# Patient Record
Sex: FEMALE | Race: WHITE | HISPANIC OR LATINO | URBAN - METROPOLITAN AREA
[De-identification: names, ages, dates, MRNs, and addresses within clinical notes are randomized per-mention and may not be internally consistent; named-entity substitution may affect disease eponyms.]

---

## 2022-12-14 ENCOUNTER — OFFICE VISIT (OUTPATIENT)
Dept: INTERNAL MEDICINE | Age: 22
End: 2022-12-14

## 2022-12-14 VITALS
SYSTOLIC BLOOD PRESSURE: 126 MMHG | OXYGEN SATURATION: 98 % | HEIGHT: 68 IN | WEIGHT: 290.6 LBS | HEART RATE: 88 BPM | BODY MASS INDEX: 44.04 KG/M2 | DIASTOLIC BLOOD PRESSURE: 82 MMHG | TEMPERATURE: 99.5 F

## 2022-12-14 DIAGNOSIS — R63.5 ABNORMAL WEIGHT GAIN: ICD-10-CM

## 2022-12-14 DIAGNOSIS — M17.12 PRIMARY OSTEOARTHRITIS OF LEFT KNEE: Primary | ICD-10-CM

## 2022-12-14 DIAGNOSIS — Z76.89 ESTABLISHING CARE WITH NEW DOCTOR, ENCOUNTER FOR: ICD-10-CM

## 2022-12-14 PROBLEM — E66.01 CLASS 3 SEVERE OBESITY DUE TO EXCESS CALORIES WITHOUT SERIOUS COMORBIDITY WITH BODY MASS INDEX (BMI) OF 40.0 TO 44.9 IN ADULT: Status: ACTIVE | Noted: 2022-12-14

## 2022-12-14 PROBLEM — J30.1 SEASONAL ALLERGIC RHINITIS DUE TO POLLEN: Status: ACTIVE | Noted: 2022-12-14

## 2022-12-14 PROCEDURE — 99204 OFFICE O/P NEW MOD 45 MIN: CPT | Performed by: NURSE PRACTITIONER

## 2022-12-14 RX ORDER — ACETAMINOPHEN AND CODEINE PHOSPHATE 120; 12 MG/5ML; MG/5ML
0.35 SOLUTION ORAL DAILY
COMMUNITY
Start: 2022-09-02

## 2022-12-14 RX ORDER — SPIRONOLACTONE 100 MG/1
100 TABLET, FILM COATED ORAL DAILY
COMMUNITY

## 2022-12-14 NOTE — PROGRESS NOTES
I N T E R N A L  M E D I C I N E  Aida Lin, APRN    ENCOUNTER DATE:  12/14/2022    Yara Carroll / 22 y.o. / female      CHIEF COMPLAINT / REASON FOR OFFICE VISIT     Establish Care (Just wants an overall checkup)      ASSESSMENT & PLAN     Diagnoses and all orders for this visit:    1. Primary osteoarthritis of left knee (Primary)  -     XR Knee 1 or 2 View Left; Future    2. Class 3 severe obesity due to excess calories without serious comorbidity with body mass index (BMI) of 40.0 to 44.9 in adult (HCC)  Patient's (Body mass index is 44.19 kg/m².) indicates that they are morbidly obese (BMI > 40 or > 35 with obesity - related health condition) with health conditions that include none . Weight is newly identified. BMI is is above average; BMI management plan is completed. We discussed low calorie, low carb based diet program, portion control, increasing exercise, joining a fitness center or start home based exercise program and Weight Watchers or other Commercial based weight reduction program.     3. Establishing care with new doctor, encounter for  -     CBC w AUTO Differential  -     Hemoglobin A1c  -     Comprehensive metabolic panel  -     TSH+Free T4  -     Lipid panel    SUMMARY/DISCUSSION  • Discussion of xray left knee and agreeable. Order for routine labs.  • Discussion of exercise, weight loss, and diet  • Followed by OB/GYN, Dr Katina Nguyen  • Followed by Dermatology for Acne  • I spent 50 min in direct care of this patient on this date of service. This time includes times spent by me in the following activities: Preparing for the visit, obtaining and/or reviewing a separately obtained history, performing a medically appropriate examination and/or evaluation, reviewing medical records, reviewing tests, ordering medications, tests, or procedures, counseling and educating the patient, documenting information in the medical record and reviewing office note/correspondence from other providers.  "    Return in about 6 months (around 6/14/2023) for Next scheduled follow up.      VITAL SIGNS     Visit Vitals  /82 (BP Location: Left arm, Patient Position: Sitting, Cuff Size: Large Adult)   Pulse 88   Temp 99.5 °F (37.5 °C) (Temporal)   Ht 172.7 cm (68\")   Wt 132 kg (290 lb 9.6 oz)   LMP 08/05/2022 (Approximate)   SpO2 98%   BMI 44.19 kg/m²           BP Readings from Last 3 Encounters:   12/14/22 126/82     Wt Readings from Last 3 Encounters:   12/14/22 132 kg (290 lb 9.6 oz)     Body mass index is 44.19 kg/m².    Blood pressure readings recorded on patient flowsheet:  No flowsheet data found.          MEDICATIONS AT THE TIME OF OFFICE VISIT     Current Outpatient Medications on File Prior to Visit   Medication Sig Dispense Refill   • norethindrone (MICRONOR) 0.35 MG tablet Take 0.35 mg by mouth Daily.     • spironolactone (ALDACTONE) 100 MG tablet Take 100 mg by mouth Daily.       No current facility-administered medications on file prior to visit.        HISTORY OF PRESENT ILLNESS     22 year old female being seen today to establish care with PCP.  States played volleyball all through high school and having left knee discomfort for past several months. States no known injury or trauma. States taking tylenol/ibuprofen with adequate relief.  States knows she needs to loose weight and exercise, monitor low fat/low cholesterol foods. Patient states family Hx of Thyroid disease and Diabetes but denies any symptoms.   Patient states chronic issue with acne and followed by Dermatology and currently on spironolactone.     Patient Care Team:  Aida Lin APRN as PCP - General (Family Medicine)    REVIEW OF SYSTEMS     Review of Systems   Constitutional: Positive for activity change.   HENT: Negative.    Respiratory: Negative.    Cardiovascular: Negative.    Gastrointestinal: Negative.    Genitourinary: Negative.    Musculoskeletal: Positive for arthralgias.   Neurological: Negative.  "   Psychiatric/Behavioral: Negative.           PHYSICAL EXAMINATION     Physical Exam  Constitutional:       Appearance: Normal appearance. She is obese.   Cardiovascular:      Rate and Rhythm: Normal rate and regular rhythm.      Pulses: Normal pulses.      Heart sounds: Normal heart sounds.   Pulmonary:      Effort: Pulmonary effort is normal.      Breath sounds: Normal breath sounds.   Abdominal:      General: Bowel sounds are normal.      Palpations: Abdomen is soft.   Musculoskeletal:         General: Tenderness present.   Skin:     General: Skin is warm and dry.   Neurological:      Mental Status: She is alert and oriented to person, place, and time.             REVIEWED DATA     Labs:     No results found for: NA, K, CALCIUM, AST, ALT, BUN, CREATININE, EGFRIFNONA, EGFRIFAFRI    No results found for: HGBA1C    Lab Results   Component Value Date    LDL 0 02/22/2021       Lab Results   Component Value Date    TSH 2.040 03/19/2021       No results found for: WBC, HGB, PLT    No results found for: MALBCRERATIO       Imaging:           Medical Tests:           Summary of old records / correspondence / consultant report:           Request outside records:           ANDREZ Poon

## 2022-12-14 NOTE — ASSESSMENT & PLAN NOTE
Patient's (Body mass index is 44.19 kg/m².) indicates that they are morbidly obese (BMI > 40 or > 35 with obesity - related health condition) with health conditions that include none . Weight is newly identified. BMI is is above average; BMI management plan is completed. We discussed low calorie, low carb based diet program, portion control, increasing exercise, joining a fitness center or start home based exercise program and Weight Watchers or other Commercial based weight reduction program.

## 2022-12-15 LAB
ALBUMIN SERPL-MCNC: 4.8 G/DL (ref 3.5–5.2)
ALBUMIN/GLOB SERPL: 2.2 G/DL
ALP SERPL-CCNC: 105 U/L (ref 39–117)
ALT SERPL-CCNC: 41 U/L (ref 1–33)
AST SERPL-CCNC: 27 U/L (ref 1–32)
BASOPHILS # BLD AUTO: 0.03 10*3/MM3 (ref 0–0.2)
BASOPHILS NFR BLD AUTO: 0.5 % (ref 0–1.5)
BILIRUB SERPL-MCNC: 0.6 MG/DL (ref 0–1.2)
BUN SERPL-MCNC: 10 MG/DL (ref 6–20)
BUN/CREAT SERPL: 18.9 (ref 7–25)
CALCIUM SERPL-MCNC: 10.2 MG/DL (ref 8.6–10.5)
CHLORIDE SERPL-SCNC: 104 MMOL/L (ref 98–107)
CHOLEST SERPL-MCNC: 169 MG/DL (ref 0–200)
CO2 SERPL-SCNC: 25.6 MMOL/L (ref 22–29)
CREAT SERPL-MCNC: 0.53 MG/DL (ref 0.57–1)
EGFRCR SERPLBLD CKD-EPI 2021: 136 ML/MIN/1.73
EOSINOPHIL # BLD AUTO: 0.08 10*3/MM3 (ref 0–0.4)
EOSINOPHIL NFR BLD AUTO: 1.4 % (ref 0.3–6.2)
ERYTHROCYTE [DISTWIDTH] IN BLOOD BY AUTOMATED COUNT: 12.2 % (ref 12.3–15.4)
GLOBULIN SER CALC-MCNC: 2.2 GM/DL
GLUCOSE SERPL-MCNC: 96 MG/DL (ref 65–99)
HBA1C MFR BLD: 5.2 % (ref 4.8–5.6)
HCT VFR BLD AUTO: 41.7 % (ref 34–46.6)
HDLC SERPL-MCNC: 47 MG/DL (ref 40–60)
HGB BLD-MCNC: 14.7 G/DL (ref 12–15.9)
IMM GRANULOCYTES # BLD AUTO: 0.02 10*3/MM3 (ref 0–0.05)
IMM GRANULOCYTES NFR BLD AUTO: 0.3 % (ref 0–0.5)
LDLC SERPL CALC-MCNC: 95 MG/DL (ref 0–100)
LYMPHOCYTES # BLD AUTO: 2.33 10*3/MM3 (ref 0.7–3.1)
LYMPHOCYTES NFR BLD AUTO: 39.5 % (ref 19.6–45.3)
MCH RBC QN AUTO: 31 PG (ref 26.6–33)
MCHC RBC AUTO-ENTMCNC: 35.3 G/DL (ref 31.5–35.7)
MCV RBC AUTO: 88 FL (ref 79–97)
MONOCYTES # BLD AUTO: 0.55 10*3/MM3 (ref 0.1–0.9)
MONOCYTES NFR BLD AUTO: 9.3 % (ref 5–12)
NEUTROPHILS # BLD AUTO: 2.89 10*3/MM3 (ref 1.7–7)
NEUTROPHILS NFR BLD AUTO: 49 % (ref 42.7–76)
NRBC BLD AUTO-RTO: 0 /100 WBC (ref 0–0.2)
PLATELET # BLD AUTO: 300 10*3/MM3 (ref 140–450)
POTASSIUM SERPL-SCNC: 4.3 MMOL/L (ref 3.5–5.2)
PROT SERPL-MCNC: 7 G/DL (ref 6–8.5)
RBC # BLD AUTO: 4.74 10*6/MM3 (ref 3.77–5.28)
SODIUM SERPL-SCNC: 141 MMOL/L (ref 136–145)
T4 FREE SERPL-MCNC: 1.61 NG/DL (ref 0.93–1.7)
TRIGL SERPL-MCNC: 153 MG/DL (ref 0–150)
TSH SERPL DL<=0.005 MIU/L-ACNC: 2.18 UIU/ML (ref 0.27–4.2)
VLDLC SERPL CALC-MCNC: 27 MG/DL (ref 5–40)
WBC # BLD AUTO: 5.9 10*3/MM3 (ref 3.4–10.8)

## 2022-12-29 ENCOUNTER — TELEPHONE (OUTPATIENT)
Dept: INTERNAL MEDICINE | Age: 22
End: 2022-12-29

## 2022-12-29 NOTE — TELEPHONE ENCOUNTER
Caller: AMOS CALLEJAS    Relationship: Mother    Best call back number: CALL PATIENT [phone]    What was the call regarding: PATIENT'S MOTHER STATED THAT PATIENT WAS REFERRED FOR AN XRAY AND DID NOT GO AND NOW THE REFERRAL HAS . PATIENT'S MOTHER IS ASKING THAT THE REFERRAL BE SENT IN AGAIN. PATIENT'S MOTHER STATED THAT PATIENT DID NOT KNOW WHERE SHE WAS SUPPOSED TO GO TO GET THE XRAY. PATIENT'S MOTHER IS ASKING THAT SOMEONE CALL PATIENT TO EXPLAIN THE PROCESS. PLEASE ADVISE.    Do you require a callback: YES

## 2022-12-30 NOTE — TELEPHONE ENCOUNTER
Called pt to discuss xray, no answer, vm was full so I could not leave one, will attempt a second call back.

## 2023-04-24 ENCOUNTER — HOSPITAL ENCOUNTER (OUTPATIENT)
Dept: GENERAL RADIOLOGY | Facility: HOSPITAL | Age: 23
Discharge: HOME | End: 2023-04-24
Admitting: NURSE PRACTITIONER
Payer: COMMERCIAL

## 2023-04-24 DIAGNOSIS — M17.12 PRIMARY OSTEOARTHRITIS OF LEFT KNEE: ICD-10-CM

## 2023-04-24 PROCEDURE — 73560 X-RAY EXAM OF KNEE 1 OR 2: CPT

## 2023-04-25 ENCOUNTER — TELEPHONE (OUTPATIENT)
Dept: INTERNAL MEDICINE | Age: 23
End: 2023-04-25

## 2023-04-25 DIAGNOSIS — E66.01 CLASS 3 SEVERE OBESITY DUE TO EXCESS CALORIES WITHOUT SERIOUS COMORBIDITY WITH BODY MASS INDEX (BMI) OF 40.0 TO 44.9 IN ADULT: Primary | ICD-10-CM

## 2023-04-25 DIAGNOSIS — M17.12 PRIMARY OSTEOARTHRITIS OF LEFT KNEE: ICD-10-CM

## 2023-04-25 NOTE — TELEPHONE ENCOUNTER
Caller: Yara Carroll    Relationship: Self    Best call back number: [phone]    What is the best time to reach you: ANY     Who are you requesting to speak with (clinical staff, provider,  specific staff member): CLINICAL STAFF     What was the call regarding: WOULD LIKE TO KNOW IF THERE IS A KNEE BRACE OR EXERCISES SHE CAN DO UNTIL HER PHYSICAL THERAPY APPOINTMENT IN 2 WEEKS FROM NOW.     Do you require a callback: YES

## 2023-05-15 ENCOUNTER — HOSPITAL ENCOUNTER (OUTPATIENT)
Dept: PHYSICAL THERAPY | Facility: HOSPITAL | Age: 23
Setting detail: THERAPIES SERIES
Discharge: HOME | End: 2023-05-15
Payer: COMMERCIAL

## 2023-05-15 DIAGNOSIS — M62.81 MUSCLE WEAKNESS OF LOWER EXTREMITY: ICD-10-CM

## 2023-05-15 DIAGNOSIS — M25.60 RANGE OF MOTION DEFICIT: ICD-10-CM

## 2023-05-15 DIAGNOSIS — M25.562 LEFT KNEE PAIN, UNSPECIFIED CHRONICITY: Primary | ICD-10-CM

## 2023-05-15 DIAGNOSIS — R26.9 GAIT DIFFICULTY: ICD-10-CM

## 2023-05-15 PROCEDURE — 97110 THERAPEUTIC EXERCISES: CPT

## 2023-05-15 PROCEDURE — 97161 PT EVAL LOW COMPLEX 20 MIN: CPT

## 2023-05-16 NOTE — THERAPY EVALUATION
Outpatient Physical Therapy Ortho Initial Evaluation  [facility]     Patient Name: Yara Carroll  : 2000  MRN: [id number]  Today's Date: 5/15/2023      Visit Date: 05/15/2023    Patient Active Problem List   Diagnosis   • Hirsutism   • Acne vulgaris   • Establishing care with new doctor, encounter for   • Primary osteoarthritis of left knee   • Seasonal allergic rhinitis due to pollen   • Class 3 severe obesity due to excess calories without serious comorbidity with body mass index (BMI) of 40.0 to 44.9 in adult        Past Medical History:   Diagnosis Date   • Allergic         Past Surgical History:   Procedure Laterality Date   • EAR TUBES Bilateral        Visit Dx:     ICD-10-CM ICD-9-CM   1. Left knee pain, unspecified chronicity  M25.562 719.46   2. Muscle weakness of lower extremity  M62.81 728.87   3. Range of motion deficit  M25.60 719.50   4. Gait difficulty  R26.9 781.2          Patient History     Row Name 05/15/23 0800             History    Chief Complaint Balance Problems  -JA      Brief Description of Current Complaint L knee pain began in HS playing volleyball, stopped and it stopped. 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 Noticed increasing pain over 6 months, took a trip and had  incresaed pain when she got back. Saw dcotor and got a knee brace that has helped the pain. Feels the brace prevents pain. On work days has pain after work, driving home is painful. A little in back of knee and inside, L knee, 1/10 today. Worst was 10/10, but now 6-7/10.  -JA      Patient/Caregiver Goals Relieve pain;Know what to do to help the symptoms  -JA      Occupation/sports/leisure activities works 45 hours a week, walks dog  -JA         Pain     Pain Location Knee  -JA      Pain at Present 1  -JA      Pain at Worst 6;7  -JA      Is your sleep disturbed? No  -JA         Fall Risk Assessment    Any falls in the past year: No  -JA         Daily Activities    Primary Language English  -JA      Are you  able to read Yes  -JA      Are you able to write Yes  -JA      How does patient learn best? Reading;Listening;Demonstration  -JA      Teaching needs identified Home Exercise Program;Management of Condition  -JA      Barriers to learning None  -JA      Recommended Referrals Physical Therapy  -JA      Pt Participated in POC and Goals Yes  -JA         Safety    Are you being hurt, hit, or frightened by anyone at home or in your life? Unspecified  -JA            User Key  (r) = Recorded By, (t) = Taken By, (c) = Cosigned By    Initials Name Provider Type    Maribel San, PT Physical Therapist                 PT Ortho     Row Name 05/15/23 0900       General ROM    RT Lower Ext Rt Knee Extension/Flexion  -JA    LT Lower Ext Lt Knee Extension/Flexion  -JA    GENERAL ROM COMMENTS L involved side  -JA       Right Lower Ext    Rt Knee Extension/Flexion AROM +10-0-135  -JA    RT Lower Extremity Comments uninvolved side  -JA       Left Lower Ext    Lt Knee Extension/Flexion AROM 0-0-125  -JA    LT Lower Extremity Comments involved knee  -JA       MMT (Manual Muscle Testing)    Rt Lower Ext Rt Hip Flexion;Rt Hip Extension;Rt Hip ABduction;Rt Hip Internal (Medial) Rotation;Rt Hip External (Lateral) Rotation;Rt Knee Extension;Rt Knee Flexion;Rt Ankle Plantarflexion;Rt Ankle Dorsiflexion  -JA    Lt Lower Ext Lt Hip Flexion;Lt Hip ABduction;Lt Hip Extension;Lt Hip Internal (Medial) Rotation;Lt Hip External (Lateral) Rotation;Lt Knee Extension;Lt Knee Flexion;Lt Ankle Plantarflexion;Lt Ankle Dorsiflexion  -JA       MMT Right Lower Ext    Rt Hip Flexion MMT, Gross Movement (5/5) normal  -JA    Rt Hip Extension MMT, Gross Movement (4/5) good  -JA    Rt Hip ABduction MMT, Gross Movement (4+/5) good plus  -JA    Rt Hip Internal (Medial) Rotation MMT, Gross Movement (4/5) good  -JA    Rt Hip External (Lateral) Rotation MMT, Gross Movement (4-/5) good minus  -JA    Rt Knee Extension MMT, Gross Movement (4/5) good  -JA    Rt Knee  Flexion MMT, Gross Movement (4+/5) good plus  -JA    Rt Ankle Plantarflexion MMT, Gross Movement (5/5) normal  -JA    Rt Ankle Dorsiflexion MMT, Gross Movement (5/5) normal  -JA       MMT Left Lower Ext    Lt Hip Flexion MMT, Gross Movement (4/5) good  -JA    Lt Hip Extension MMT, Gross Movement (3/5) fair  -JA    Lt Hip ABduction MMT, Gross Movement (3+/5) fair plus  -JA    Lt Hip Internal (Medial) Rotation MMT, Gross Movement (3+/5) fair plus  -JA    Lt Hip External (Lateral) Rotation MMT, Gross Movement (3+/5) fair plus  -JA    Lt Knee Extension MMT, Gross Movement (4-/5) good minus  -JA    Lt Knee Flexion MMT, Gross Movement (3+/5) fair plus  -JA    Lt Ankle Plantarflexion MMT, Gross Movement (4/5) good  -JA    Lt Ankle Dorsiflexion MMT, Gross Movement (4+/5) good plus  -JA       Transfers    Comment, (Transfers) STS mild add, UE  minimal pain today, pt had day off yesterday  -AVRIL       Gait/Stairs (Locomotion)    Comment, (Gait/Stairs) decreased gail, mild incresaed lateral wt shift  minimal pain today, pt had day off yesterday  -AVRIL          User Key  (r) = Recorded By, (t) = Taken By, (c) = Cosigned By    Initials Name Provider Type    Maribel San, PT Physical Therapist                            Therapy Education  Education Details: [id number] See OP Ex for details  Given: HEP, Symptoms/condition management  Program: New  How Provided: Verbal, Demonstration, Written  Provided to: Patient  Level of Understanding: Verbalized, Demonstrated      PT OP Goals     Row Name 05/15/23 1700          PT Short Term Goals    STG Date to Achieve 06/14/23  -AVRIL     STG 1 Patient will be independent and compliant with initial HEP.  -AVRIL     STG 1 Progress New  -AVRIL     STG 2 Pt will demonstrate increased  L knee ROM 0-130.  -AVRIL     STG 2 Progress New  -AVRIL        Long Term Goals    LTG Date to Achieve 07/14/23  -AVRIL     LTG 1 Pt will be imdependent with advanced HEP for LE and core strengthening to facilitate ease of  ADLs/work.  -AVRIL     LTG 1 Progress New  -JA     LTG 2 Pt will erport no more than 3-4/10 pain after shift at work.  -JA     LTG 2 Progress New  -JA     LTG 3 Pt will demonstrate increased strength L LE 4+/5 or better.  -JA     LTG 3 Progress New  -JA     LTG 4 Pt will score 75% or > on KOS indicating decrease in perceived functional disability.  -AVRIL     LTG 4 Progress New  -AVRIL        Time Calculation    PT Goal Re-Cert Due Date 08/13/23  -AVRIL           User Key  (r) = Recorded By, (t) = Taken By, (c) = Cosigned By    Initials Name Provider Type    Maribel San, PT Physical Therapist                 PT Assessment/Plan     Row Name 05/15/23 0800          PT Assessment    Functional Limitations Impaired gait;Limitation in home management;Limitations in community activities;Limitations in functional capacity and performance;Performance in leisure activities;Performance in self-care ADL  -AVRIL     Impairments Pain;Muscle strength;Range of motion;Posture;Poor body mechanics;Gait;Endurance;Balance  -     Assessment Comments Yara Carroll is a 22 y.o. female referred to outpatient physical therapy for evaluation and treatment of left knee pain secondary to OA.  Patient presents with decreased strength in L LE/hip girdle/core, decreased L knee ROM, point tenderness posterior and lateral knee, gait deficits, difficulty with transitional movements and impaired functional mobility. Pain fluctuates between 1 and 7/10 depending on activity.  Signs and symptoms are consistent with referring diagnosis.  Pertinent comorbidities and personal factors that may affect progress include, but are not limited to, chronicity of pain.  This condition is stable. Recommend skilled PT to address functional deficits. Thank you for this referral.  -AVRIL     Please refer to paper survey for additional self-reported information Yes  -JA     Rehab Potential Good  -AVRIL     Patient/caregiver participated in establishment of treatment plan  and goals Yes  -JA     Patient would benefit from skilled therapy intervention Yes  -JA        PT Plan    PT Frequency 1x/week  -     Predicted Duration of Therapy Intervention (PT) 8 visits  -JA     Planned CPT's? PT EVAL LOW COMPLEXITY: 60527;PT RE-EVAL: 19235;PT THER PROC EA 15 MIN: 04972;PT THER ACT EA 15 MIN: 07799;PT MANUAL THERAPY EA 15 MIN: 87962;PT NEUROMUSC RE-EDUCATION EA 15 MIN: 75823;PT GAIT TRAINING EA 15 MIN: 70546  -     PT Plan Comments review HEP, progress ther ex HL Hip abd w/resistance, STS, poss reisted sidestep  -JA           User Key  (r) = Recorded By, (t) = Taken By, (c) = Cosigned By    Initials Name Provider Type    Maribel San, PT Physical Therapist                   OP Exercises     Row Name 05/15/23 0800             Subjective Comments    Subjective Comments initial eval  -JA         Total Minutes    76973 - PT Therapeutic Exercise Minutes 25  -JA         Exercise 1    Exercise Name 1 standing calf stretch  -JA         Exercise 2    Exercise Name 2 seated hamstring stretch  -JA         Exercise 3    Exercise Name 3 SLR  -JA      Cueing 3 Verbal;Tactile  -JA      Reps 3 5  -JA      Time 3 3sec  -JA         Exercise 4    Exercise Name 4 SL hip abd  -JA      Cueing 4 Verbal;Tactile;Demo  -JA      Reps 4 5  -JA         Exercise 5    Exercise Name 5 Prone hip ext  -JA      Cueing 5 Verbal;Tactile  -JA      Reps 5 5  -JA      Additional Comments cued to lift low to avoid involving lumbar muscles  -JA         Exercise 6    Exercise Name 6 SL hip add  -JA      Cueing 6 Verbal;Demo  -JA      Reps 6 discussed only  -JA         Exercise 7    Exercise Name 7 TA in HL  -JA      Cueing 7 Verbal  -JA      Reps 7 5  -JA      Additional Comments use TA with SLR, SL abd/add  -JA         Exercise 8    Exercise Name 8 Educated for footwear, standing without locking knees, importance of stabiliy in joints above and below knee, use of ice/cold for pain reduction/control.  -JA      Time 8 8min   -AVRIL            User Key  (r) = Recorded By, (t) = Taken By, (c) = Cosigned By    Initials Name Provider Type    Maribel San, PT Physical Therapist                              Outcome Measure Options: Knee Outcome Score- ADL  Knee Outcome Survey Activities of Daily Living Scale  Symptoms: Pain: The symptom affects my activity moderately  Symptoms: Stiffness: The symptom affects my activity moderately  Symptoms: Swelling: The symptom affects my activity moderately  Symptoms: Giving way, buckling, or shifting of the knee: The symptom affects my activity moderately  Symptoms: Weakness: The symptom affects my activity moderately  Symptoms: Limping: The symptom affects my activity moderately  Functional Limitations with ADL's: Walk: Activity is minimally difficult  Functional Limitations with ADL's: Go up stairs: Activity is somewhat difficult  Functional Limitations with ADL's: Go down stairs: Activity is fairly difficult  Functional Limitations with ADL's: Stand: Activity is minimally difficult  Functional Limitations with ADL's: Kneel on front of your knee: Activity is very difficult  Functional Limitations with ADL's: Squat: Activity is somewhat difficult  Functional Limitations with ADL's: Sit with your knee bent: Activity is somewhat difficult  Functional Limitations with ADL's: Rise from a chair: Activity is somewhat difficult  Knee Outcome Summary ADL's Score: 50 %      Time Calculation:     Start Time: 0845  Stop Time: 0930  Time Calculation (min): 45 min  Timed Charges  17611 - PT Therapeutic Exercise Minutes: 25  Untimed Charges  PT Eval/Re-eval Minutes: 20  Total Minutes  Timed Charges Total Minutes: 25  Untimed Charges Total Minutes: 20   Total Minutes: 45     Therapy Charges for Today     Code Description Service Date Service Provider Modifiers Qty    61482582883 HC PT THER PROC EA 15 MIN 5/15/2023 Maribel Schilling, PT GP 2    46328809797 HC PT EVAL LOW COMPLEXITY 1 5/15/2023 Maribel Schilling  N, PT GP 1          PT G-Codes  Outcome Measure Options: Knee Outcome Score- ADL         Maribel Schilling, PT  5/15/2023

## 2023-05-24 ENCOUNTER — HOSPITAL ENCOUNTER (OUTPATIENT)
Dept: PHYSICAL THERAPY | Facility: HOSPITAL | Age: 23
Setting detail: THERAPIES SERIES
Discharge: HOME | End: 2023-05-24
Payer: COMMERCIAL

## 2023-05-24 DIAGNOSIS — M62.81 MUSCLE WEAKNESS OF LOWER EXTREMITY: ICD-10-CM

## 2023-05-24 DIAGNOSIS — R26.9 GAIT DIFFICULTY: ICD-10-CM

## 2023-05-24 DIAGNOSIS — M25.562 LEFT KNEE PAIN, UNSPECIFIED CHRONICITY: Primary | ICD-10-CM

## 2023-05-24 DIAGNOSIS — M25.60 RANGE OF MOTION DEFICIT: ICD-10-CM

## 2023-05-24 PROCEDURE — 97110 THERAPEUTIC EXERCISES: CPT

## 2023-05-24 NOTE — THERAPY TREATMENT NOTE
Outpatient Physical Therapy Ortho Treatment Note  [facility]     Patient Name: Yara Carroll  : 2000  MRN: [id number]  Today's Date: 2023      Visit Date: 2023    Visit Dx:    ICD-10-CM ICD-9-CM   1. Left knee pain, unspecified chronicity  M25.562 719.46   2. Muscle weakness of lower extremity  M62.81 728.87   3. Gait difficulty  R26.9 781.2   4. Range of motion deficit  M25.60 719.50       Patient Active Problem List   Diagnosis   • Hirsutism   • Acne vulgaris   • Establishing care with new doctor, encounter for   • Primary osteoarthritis of left knee   • Seasonal allergic rhinitis due to pollen   • Class 3 severe obesity due to excess calories without serious comorbidity with body mass index (BMI) of 40.0 to 44.9 in adult        Past Medical History:   Diagnosis Date   • Allergic         Past Surgical History:   Procedure Laterality Date   • EAR TUBES Bilateral                         PT Assessment/Plan     Row Name 23 0700          PT Assessment    Assessment Comments Yara returns for first followup visit and notes good response to initial HEP. She noticed her knee pain after work is less than prior to beginning the exercises. Able to progress with resistance walking and STS. Progressed core strengthening as well. Cues required with STS for equal weight distribution, tends to rely more on R. updated HEP and printed for home.  -AVRIL        PT Plan    PT Plan Comments assess response to new ex's, knee pain level after work, consider markos Palafox  -AVRIL           User Key  (r) = Recorded By, (t) = Taken By, (c) = Cosigned By    Initials Name Provider Type    Maribel San, PT Physical Therapist                   OP Exercises     Row Name 23 0700             Subjective Comments    Subjective Comments the exercises seem to be helping, I didn't have as much pain after work. It was 2-3/10.  -AVRIL         Subjective Pain    Able to rate subjective pain? yes  -AVRIL       Pre-Treatment Pain Level 0  -JA         Total Minutes    11505 - PT Therapeutic Exercise Minutes 40  -JA         Exercise 1    Exercise Name 1 standing calf stretch  -JA      Reps 1 2  -JA      Time 1 20ssec ea  -JA         Exercise 2    Exercise Name 2 seated hamstring stretch  -JA      Reps 2 2  -JA      Time 2 20sec  -JA         Exercise 3    Exercise Name 3 SLR  -JA      Cueing 3 Verbal;Tactile  -JA      Reps 3 15  -JA      Time 3 3sec  -JA         Exercise 4    Exercise Name 4 SL hip abd  -JA      Cueing 4 Verbal;Tactile;Demo  -JA      Reps 4 5  -JA         Exercise 5    Exercise Name 5 Prone hip ext  -JA      Cueing 5 Verbal;Tactile  -JA      Reps 5 10 ea  -JA         Exercise 6    Exercise Name 6 SL hip add  -JA      Cueing 6 Verbal;Demo  -JA      Reps 6 10ea  -JA         Exercise 7    Exercise Name 7 TA in HL  -JA      Cueing 7 Verbal  -JA      Reps 7 10ea  -JA         Exercise 8    Exercise Name 8 STS  -JA      Reps 8 10  -JA      Time 8 cued wt shift to L  -JA         Exercise 9    Exercise Name 9 resisted sidestepping  -JA      Reps 9 3 laps  -JA      Time 9 RTB  -JA         Exercise 10    Exercise Name 10 resisted monster  -JA      Reps 10 3 laps  -JA      Time 10 GTB  -JA         Exercise 11    Exercise Name 11 step up/down blue foam  -JA      Sets 11 4  -JA      Reps 11 5ea  -JA      Additional Comments knee   -JA         Exercise 12    Exercise Name 12 AR press with mild crouch, TA  -JA      Reps 12 10 ea  -JA      Additional Comments GTB  -JA            User Key  (r) = Recorded By, (t) = Taken By, (c) = Cosigned By    Initials Name Provider Type    Maribel San, PT Physical Therapist                              PT OP Goals     Row Name 05/24/23 0700          PT Short Term Goals    STG Date to Achieve 06/14/23  -JA     STG 1 Patient will be independent and compliant with initial HEP.  -JA     STG 1 Progress Met  -JA     STG 2 Pt will demonstrate increased  L knee ROM 0-130.  -JA      STG 2 Progress Ongoing  -AVRIL        Long Term Goals    LTG Date to Achieve 07/14/23  -AVRIL     LTG 1 Pt will be imdependent with advanced HEP for LE and core strengthening to facilitate ease of ADLs/work.  -AVRIL     LTG 1 Progress Ongoing  -AVRIL     LTG 2 Pt will erport no more than 3-4/10 pain after shift at work.  -AVRIL     LTG 2 Progress Ongoing  -AVRIL     LTG 3 Pt will demonstrate increased strength L LE 4+/5 or better.  -AVRIL     LTG 3 Progress Ongoing  -AVRIL     LTG 4 Pt will score 75% or > on KOS indicating decrease in perceived functional disability.  -AVRIL     LTG 4 Progress Ongoing  -AVRIL           User Key  (r) = Recorded By, (t) = Taken By, (c) = Cosigned By    Initials Name Provider Type    Maribel San, PT Physical Therapist                Therapy Education  Education Details: cuing required to slow down and minor form issues.              Time Calculation:   Start Time: 0715  Stop Time: 0800  Time Calculation (min): 45 min  Timed Charges  40513 - PT Therapeutic Exercise Minutes: 40  Total Minutes  Timed Charges Total Minutes: 40   Total Minutes: 40  Therapy Charges for Today     Code Description Service Date Service Provider Modifiers Qty    52317600938 HC PT THER PROC EA 15 MIN 5/24/2023 Maribel Schilling PT GP 3                    Maribel Schilling PT  5/24/2023

## 2023-06-13 ENCOUNTER — OFFICE VISIT (OUTPATIENT)
Dept: INTERNAL MEDICINE | Age: 23
End: 2023-06-13
Payer: COMMERCIAL

## 2023-06-13 ENCOUNTER — LAB (OUTPATIENT)
Dept: LAB | Facility: HOSPITAL | Age: 23
End: 2023-06-13
Payer: COMMERCIAL

## 2023-06-13 VITALS
HEART RATE: 87 BPM | TEMPERATURE: 100 F | SYSTOLIC BLOOD PRESSURE: 134 MMHG | WEIGHT: 287.4 LBS | OXYGEN SATURATION: 98 % | DIASTOLIC BLOOD PRESSURE: 88 MMHG | HEIGHT: 68 IN | BODY MASS INDEX: 43.56 KG/M2

## 2023-06-13 DIAGNOSIS — E66.01 CLASS 3 SEVERE OBESITY DUE TO EXCESS CALORIES WITHOUT SERIOUS COMORBIDITY WITH BODY MASS INDEX (BMI) OF 40.0 TO 44.9 IN ADULT: ICD-10-CM

## 2023-06-13 DIAGNOSIS — J30.1 SEASONAL ALLERGIC RHINITIS DUE TO POLLEN: ICD-10-CM

## 2023-06-13 DIAGNOSIS — Z11.59 ENCOUNTER FOR HEPATITIS C SCREENING TEST FOR LOW RISK PATIENT: ICD-10-CM

## 2023-06-13 DIAGNOSIS — Z00.00 ANNUAL VISIT FOR GENERAL ADULT MEDICAL EXAMINATION WITHOUT ABNORMAL FINDINGS: Primary | ICD-10-CM

## 2023-06-13 DIAGNOSIS — Z11.3 SCREENING EXAMINATION FOR STD (SEXUALLY TRANSMITTED DISEASE): ICD-10-CM

## 2023-06-13 LAB — HCV AB SER DONR QL: NORMAL

## 2023-06-13 PROCEDURE — 36415 COLL VENOUS BLD VENIPUNCTURE: CPT | Performed by: NURSE PRACTITIONER

## 2023-06-13 PROCEDURE — 86803 HEPATITIS C AB TEST: CPT | Performed by: NURSE PRACTITIONER

## 2023-06-13 RX ORDER — CLOTRIMAZOLE 1 %
CREAM (GRAM) TOPICAL
COMMUNITY
Start: 2023-03-03 | End: 2023-06-13

## 2023-06-13 NOTE — PROGRESS NOTES
"    I N T E R N A L  M E D I C I N E    Aidakenn Lin, ANDREZ      ENCOUNTER DATE:  2023    Yara Ramírezakis / 22 y.o. / female    CHIEF COMPLAINT     Annual Exam      VITALS     Vitals:    23 1531   BP: 134/88   BP Location: Left arm   Patient Position: Sitting   Cuff Size: Large Adult   Pulse: 87   Temp: 100 °F (37.8 °C)   TempSrc: Temporal   SpO2: 98%   Weight: 130 kg (287 lb 6.4 oz)   Height: 172.7 cm (68\")       BP Readings from Last 3 Encounters:   23 134/88   22 126/82     Wt Readings from Last 3 Encounters:   23 130 kg (287 lb 6.4 oz)   22 132 kg (290 lb 9.6 oz)      Body mass index is 43.7 kg/m².        MEDICATIONS     Current Outpatient Medications on File Prior to Visit   Medication Sig Dispense Refill   • [DISCONTINUED] clotrimazole (LOTRIMIN) 1 % cream APPLY TO RIGHT UNDERARM AREA TWICE DAILY (Patient not taking: Reported on 2023)     • [DISCONTINUED] norethindrone (MICRONOR) 0.35 MG tablet Take 0.35 mg by mouth Daily. (Patient not taking: Reported on 2023)     • [DISCONTINUED] spironolactone (ALDACTONE) 100 MG tablet Take 100 mg by mouth Daily. (Patient not taking: Reported on 2023)       No current facility-administered medications on file prior to visit.         HISTORY OF PRESENT ILLNESS     Yara presents for annual health maintenance visit.      · General health: fair  · Lifestyle:  · Attempting to lose weight?: Yes   · Diet: eats decently  · Exercise: exercises 2 days/week  · Tobacco: Never used   · Alcohol: occasional/infrequent  · Work: Full-time  · Reproductive health:  · Sexually active?: Yes   · Sexual problems?: No problems  · Concern for STD?: No    · Sees Gynecologist?: Yes   · Keisha/Postmenopausal?: No   · Depression Screenin/13/2023     3:29 PM   PHQ-2/PHQ-9 Depression Screening   Little Interest or Pleasure in Doing Things 0-->not at all   PHQ-9: Brief Depression Severity Measure Score 0         PHQ-2: 0 (Not " depressed)   PHQ-9: 0 (Negative screening for depression)    Patient Care Team:  Aida Lin APRN as PCP - General (Family Medicine)      ALLERGIES  Allergies   Allergen Reactions   • Tree Nut Swelling        PFSH:     The following portions of the patient's history were reviewed and updated as appropriate: Allergies / Current Medications / Past Medical History / Surgical History / Social History / Family History    PROBLEM LIST   Patient Active Problem List   Diagnosis   • Hirsutism   • Acne vulgaris   • Annual visit for general adult medical examination without abnormal findings   • Primary osteoarthritis of left knee   • Seasonal allergic rhinitis due to pollen   • Class 3 severe obesity due to excess calories without serious comorbidity with body mass index (BMI) of 40.0 to 44.9 in adult       PAST MEDICAL HISTORY  Past Medical History:   Diagnosis Date   • Allergic        SURGICAL HISTORY  Past Surgical History:   Procedure Laterality Date   • EAR TUBES Bilateral        SOCIAL HISTORY  Social History     Socioeconomic History   • Marital status: Single   Tobacco Use   • Smoking status: Never   • Smokeless tobacco: Never   Vaping Use   • Vaping Use: Never used   Substance and Sexual Activity   • Alcohol use: Never   • Drug use: Yes     Types: Marijuana     Comment: socially   • Sexual activity: Yes     Partners: Male       FAMILY HISTORY  Family History   Problem Relation Age of Onset   • Diabetes Mother    • Thyroid disease Father    • Diabetes Father        IMMUNIZATION HISTORY    There is no immunization history on file for this patient.      REVIEW OF SYSTEMS     Review of Systems   Constitutional: Negative for activity change, appetite change, chills, fever and unexpected weight change.   HENT: Negative.    Eyes: Negative.    Respiratory: Negative.  Negative for cough, chest tightness and shortness of breath.    Cardiovascular: Negative.  Negative for chest pain and palpitations.   Gastrointestinal:  Negative.  Negative for abdominal distention, abdominal pain, constipation, diarrhea, nausea and vomiting.   Genitourinary: Negative.    Musculoskeletal: Negative.    Skin: Negative.    Allergic/Immunologic: Negative.    Neurological: Negative.  Negative for dizziness and headaches.   Hematological: Negative.    Psychiatric/Behavioral: Negative.  Negative for self-injury, sleep disturbance and suicidal ideas. The patient is not nervous/anxious.          PHYSICAL EXAMINATION     Physical Exam  Constitutional:       General: She is not in acute distress.     Appearance: Normal appearance. She is well-developed. She is obese.   HENT:      Head: Normocephalic and atraumatic.      Right Ear: Tympanic membrane, ear canal and external ear normal.      Left Ear: Tympanic membrane, ear canal and external ear normal.      Nose: Nose normal. No congestion or rhinorrhea.      Mouth/Throat:      Mouth: Mucous membranes are moist.      Pharynx: Oropharynx is clear. No oropharyngeal exudate or posterior oropharyngeal erythema.   Eyes:      General: No scleral icterus.     Conjunctiva/sclera: Conjunctivae normal.      Pupils: Pupils are equal, round, and reactive to light.   Neck:      Thyroid: No thyroid mass or thyromegaly.      Vascular: No carotid bruit.      Trachea: No tracheal deviation.   Cardiovascular:      Rate and Rhythm: Normal rate and regular rhythm.      Pulses: Normal pulses.      Heart sounds: Normal heart sounds.   Pulmonary:      Effort: Pulmonary effort is normal. No respiratory distress.      Breath sounds: Normal breath sounds.   Abdominal:      General: Bowel sounds are normal. There is no distension.      Palpations: Abdomen is soft. There is no mass.      Tenderness: There is no abdominal tenderness.      Hernia: No hernia is present.   Musculoskeletal:         General: Normal range of motion.      Cervical back: Normal range of motion and neck supple.   Lymphadenopathy:      Cervical: No cervical  adenopathy.   Skin:     General: Skin is warm.      Coloration: Skin is not jaundiced or pale.      Findings: No rash.   Neurological:      General: No focal deficit present.      Mental Status: She is alert and oriented to person, place, and time. Mental status is at baseline.      Cranial Nerves: No cranial nerve deficit.      Motor: No abnormal muscle tone.      Coordination: Coordination normal.   Psychiatric:         Mood and Affect: Mood normal.         Behavior: Behavior normal.         Thought Content: Thought content normal.         Judgment: Judgment normal.         REVIEWED DATA      Labs:    Lab Results   Component Value Date     12/14/2022    K 4.3 12/14/2022    CALCIUM 10.2 12/14/2022    AST 27 12/14/2022    ALT 41 (H) 12/14/2022    BUN 10 12/14/2022    CREATININE 0.53 (L) 12/14/2022       Lab Results   Component Value Date    GLUCOSE 96 12/14/2022    HGBA1C 5.20 12/14/2022    TSH 2.180 12/14/2022    FREET4 1.61 12/14/2022       Lab Results   Component Value Date    LDL 95 12/14/2022    HDL 47 12/14/2022    TRIG 153 (H) 12/14/2022       No results found for: OYPT01ZQ     Lab Results   Component Value Date    WBC 5.90 12/14/2022    HGB 14.7 12/14/2022    MCV 88.0 12/14/2022     12/14/2022       No results found for: PROTEIN, GLUCOSEU, BLOODU, NITRITEU, LEUKOCYTESUR     No results found for: HEPCVIRUSABY    Imaging:        Medical Tests:        ASSESSMENT & PLAN     ANNUAL WELLNESS EXAM / PHYSICAL     Diagnoses and all orders for this visit:    1. Annual visit for general adult medical examination without abnormal findings (Primary)    2. Class 3 severe obesity due to excess calories without serious comorbidity with body mass index (BMI) of 40.0 to 44.9 in adult  Assessment & Plan:  Discussion of weight loss measures including diet- low fat, low cholesterol foods, exercise at least 30 minutes 3 days a week, and calorie counting.         3. Seasonal allergic rhinitis due to pollen  Assessment  & Plan:  Currently not on medications       4. Encounter for hepatitis C screening test for low risk patient  -     Hepatitis C antibody    5. Screening examination for STD (sexually transmitted disease)  -     Chlamydia trachomatis, Neisseria gonorrhoeae, Trichomonas vaginalis, PCR - Swab, Vagina        Return in about 6 months (around 12/13/2023) for Next scheduled follow up.      HEALTHCARE MAINTENANCE ISSUES     Cancer Screening:  · Colon: Initial/Next screening at age: 50  · Repeat colon cancer screening: N/A at this time  · Breast: Recommended monthly self exams; annual professional exam  · Mammogram: N/A at this time  · Cervical: pelvic exam as recommended by gyne  · Skin: Monthly self skin examination, annual exam by health professional  · Lung: Does not meet criteria for lung cancer screening.   · Other:    Screening Labs & Tests:  · Lab results reviewed & discussed with the patient or test orders placed today.  · EKG:  · CV Screening: No special screening needed  · DEXA (65+ or postmenopausal with risk factors):   · HEP C (If born [birth year]05[birth year], or risk factors): Ordered  · Other:     Immunization/Vaccinations (to be given today unless deferred by patient)  · Influenza: Patient had the flu shot this season  · Hepatitis A: Not needed at this time  · Hepatitis B: Not needed at this time  · Tetanus/Pertussis: Up to date  · Pneumococcal: Not needed at this time  · Shingles: Not needed at this time  · COVID: Had the primary vaccine series and 1st/2nd boosters    Lifestyle Counseling:  · Lifestyle Modifications: Attempt to lose weight, Improve dietary compliance, Begin progressive aerobic exercise program 3-5 days a week, Follow a low fat, low cholesterol diet, Decrease or avoid alcohol intake and Reduce exposure to stress if possible  · Safety Issues: Always wear seatbelt, Avoid texting while driving   · Use sunscreen, regular skin examination  · Recommended annual dental/vision  examination.  · Emotional/Stress/Sleep: Reviewed and  given when appropriate      Health Maintenance   Topic Date Due   • HPV VACCINES (1 - 2-dose series) Never done   • COVID-19 Vaccine (3 - Moderna series) 07/27/2021   • HEPATITIS C SCREENING  Never done   • ANNUAL PHYSICAL  Never done   • CHLAMYDIA SCREENING  Never done   • TDAP/TD VACCINES (2 - Td or Tdap) 09/23/2023   • INFLUENZA VACCINE  10/01/2023   • MENINGOCOCCAL VACCINE  Completed   • Pneumococcal Vaccine 0-64  Aged Out

## 2023-06-13 NOTE — ASSESSMENT & PLAN NOTE
Discussion of weight loss measures including diet- low fat, low cholesterol foods, exercise at least 30 minutes 3 days a week, and calorie counting.

## 2023-11-02 ENCOUNTER — OFFICE VISIT (OUTPATIENT)
Dept: INTERNAL MEDICINE | Age: 23
End: 2023-11-02
Payer: COMMERCIAL

## 2023-11-02 VITALS
HEART RATE: 66 BPM | DIASTOLIC BLOOD PRESSURE: 76 MMHG | SYSTOLIC BLOOD PRESSURE: 114 MMHG | HEIGHT: 68 IN | OXYGEN SATURATION: 100 % | TEMPERATURE: 97.6 F | WEIGHT: 240 LBS | BODY MASS INDEX: 36.37 KG/M2

## 2023-11-02 DIAGNOSIS — N63.0 BREAST NODULE: ICD-10-CM

## 2023-11-02 DIAGNOSIS — Z23 ENCOUNTER FOR IMMUNIZATION: ICD-10-CM

## 2023-11-02 DIAGNOSIS — Z76.89 ENCOUNTER TO ESTABLISH CARE: Primary | ICD-10-CM

## 2023-11-02 DIAGNOSIS — E66.09 CLASS 2 OBESITY DUE TO EXCESS CALORIES WITHOUT SERIOUS COMORBIDITY WITH BODY MASS INDEX (BMI) OF 36.0 TO 36.9 IN ADULT: ICD-10-CM

## 2023-11-02 DIAGNOSIS — Z01.419 ROUTINE GYNECOLOGICAL EXAMINATION: ICD-10-CM

## 2023-11-02 NOTE — LETTER
[facility]  Vaccine Consent Form    Patient Name:  Yara Carroll  Patient :  2000     Vaccine(s) Ordered    Tdap Vaccine Greater Than or Equal To 8yo IM        Screening Checklist  The following questions should be completed prior to vaccination. If you answer “yes” to any question, it does not necessarily mean you should not be vaccinated. It just means we may need to clarify or ask more questions. If a question is unclear, please ask your healthcare provider to explain it.    Yes No   Any fever or moderate to severe illness today (mild illness and/or antibiotic treatment are not contraindications)?     Do you have a history of a serious reaction to any previous vaccinations, such as anaphylaxis, encephalopathy within 7 days, Guillain-[place] syndrome within 6 weeks, seizure?     Have you received any live vaccine(s) in the past month (MMR, MARY)?     Do you have an anaphylactic allergy to latex (DTaP, DTaP-IPV, Hep A, Hep B, MenB, RV, Td, Tdap), baker’s yeast (Hep B, HPV), or gelatin (MARY, MMR)?     Do you have an anaphylactic allergy to neomycin (Rabies, MARY, MMR, IPV, Hep A), polymyxin B (IPV), or streptomycin (IPV)?      Any cancer, leukemia, AIDS, or other immune system disorder? (MARY, MMR, RV)     Do you have a parent, brother, or sister with an immune system problem (if immune competence of vaccine recipient clinically verified, can proceed)? (MMR, MARY)     Any recent steroid treatments for >2 weeks, chemotherapy, or radiation treatment? (MARY, MMR)     Have you received antibody-containing blood transfusions or IVIG in the past 11 months (recommended interval is dependent on product)? (MMR, MARY)     Have you taken antiviral drugs (acyclovir, famciclovir, valacyclovir) in the last 24 or 48 hours, respectively (MARY)?      Are you pregnant or planning to become pregnant within 1 month? (MARY, MMR, HPV, IPV, MenB; For hep B- refer to Engerix-B)     For infants, have you ever been told your child  has had intussusception or a medical emergency involving obstruction of the intestine (RV)? If not for an infant, can skip this question.         *Ordering Physician/APC should be consulted if “yes” is checked by the patient or guardian above.      I have received, read, and understand the Vaccine Information Statement (VIS) for each vaccine ordered above.  I have considered my health status as well as the health status of my close contacts.  I have taken the opportunity to discuss my vaccine questions with my health care provider.   I have requested that the ordered vaccine(s) be given to me.  I understand the benefits and risks of the vaccines.  I understand that I should remain in the clinic for 15 minutes after receiving the vaccine(s).  _________________________________________________________  Signature of Patient or Parent/Legal Guardian ____________________  Date

## 2023-11-02 NOTE — PROGRESS NOTES
"    I N T E R N A L  M E D I C I N E  ANDREZ Hernández    ENCOUNTER DATE:  11/02/2023    Yara Carroll / 23 y.o. / female      CHIEF COMPLAINT / REASON FOR OFFICE VISIT     Establish Care and Breast Problem (Lump under left breast )      ASSESSMENT & PLAN     Diagnoses and all orders for this visit:    1. Encounter to establish care (Primary)    2. Class 2 obesity due to excess calories without serious comorbidity with body mass index (BMI) of 36.0 to 36.9 in adult    3. Breast nodule  -     US breast left complete; Future    4. Routine gynecological examination  -     Ambulatory Referral to Gynecology    5. Encounter for immunization  -     Tdap Vaccine Greater Than or Equal To 8yo IM         SUMMARY/DISCUSSION  She was commended on weight loss and encouraged to continue to adopt healthy life style habits.    Will investigate patients painless left breast nodule with US.  She is aware to monitor for any breast changes, new symptoms.    She will meet with GYN to receive first pap.        Next Appointment with me: Visit date not found    Return in about 7 months (around 6/14/2024) for Annual physical.      VITAL SIGNS     Visit Vitals  /76   Pulse 66   Temp 97.6 °F (36.4 °C)   Ht 172.7 cm (67.99\")   Wt 109 kg (240 lb)   LMP 10/04/2023   SpO2 100%   BMI 36.50 kg/m²             Wt Readings from Last 3 Encounters:   11/02/23 109 kg (240 lb)   06/13/23 130 kg (287 lb 6.4 oz)   12/14/22 132 kg (290 lb 9.6 oz)     Body mass index is 36.5 kg/m².        MEDICATIONS AT THE TIME OF OFFICE VISIT     No current outpatient medications on file prior to visit.     No current facility-administered medications on file prior to visit.        HISTORY OF PRESENT ILLNESS     Here to establish care.  Former patient of ANDREZ Betts.      BMI 36: She has lost 50 pound since December 2022 through improved dietary habits.  She continues to work actively towards weight loss.      December 2022 Lipid panel with mild " "elevation of triglycerides, 153 and normal LDL 95.  December 2022 CMP with mild elevation of liver enzyme ALT, 41.  December 2022 Hemoglobin A1C was normal, 5.2.  Does have + family history of diabetes.    Recently turned 23, and is due for first pap.  Agreeable for referral to GYN.  She is currently sexually active with boyfriend, and declines need for birth control.  She states she is open to pregnancy.      2 weeks ago she noticed a yellow bruise on left breast and then found a \"knot\" in the same area.  Denies any prior history of breast trauma.  \"Knot\" has not resolved after two weeks.  Denies any breast pain, nipple discharge.  No family history of breast cancer.  No fever, chills, night sweats.        Patient Care Team:  Neelima Harper APRN as PCP - General (Family Medicine)    REVIEW OF SYSTEMS     Review of Systems   Constitutional:  Negative for chills, fever and unexpected weight change.   Respiratory:  Negative for cough, chest tightness and shortness of breath.    Cardiovascular:  Negative for chest pain, palpitations and leg swelling.   Neurological:  Negative for dizziness, weakness, light-headedness and headaches.   Psychiatric/Behavioral:  The patient is not nervous/anxious.           PHYSICAL EXAMINATION     Physical Exam  Vitals reviewed.   Constitutional:       General: She is not in acute distress.     Appearance: Normal appearance. She is not ill-appearing, toxic-appearing or diaphoretic.   HENT:      Head: Normocephalic and atraumatic.   Cardiovascular:      Rate and Rhythm: Normal rate and regular rhythm.      Heart sounds: Normal heart sounds.   Pulmonary:      Effort: Pulmonary effort is normal.      Breath sounds: Normal breath sounds.   Chest:      Comments: Pea sized round nodule located at 2 o'clock of left breast without overlying skin changes, no pain with palpation  Musculoskeletal:      Right lower leg: No edema.      Left lower leg: No edema.   Neurological:      Mental Status: She " is alert and oriented to person, place, and time. Mental status is at baseline.   Psychiatric:         Mood and Affect: Mood normal.         Behavior: Behavior normal.         Thought Content: Thought content normal.         Judgment: Judgment normal.           REVIEWED DATA     Labs:           Imaging:            Medical Tests:           Summary of old records / correspondence / consultant report:           Request outside records:

## 2023-11-13 ENCOUNTER — HOSPITAL ENCOUNTER (OUTPATIENT)
Dept: ULTRASOUND IMAGING | Facility: HOSPITAL | Age: 23
Discharge: HOME | End: 2023-11-13
Payer: COMMERCIAL

## 2023-11-13 DIAGNOSIS — N63.0 BREAST NODULE: ICD-10-CM

## 2023-11-13 PROCEDURE — 76642 ULTRASOUND BREAST LIMITED: CPT

## 2023-11-16 DIAGNOSIS — N63.0 BREAST NODULE: Primary | ICD-10-CM

## 2024-02-20 ENCOUNTER — HOSPITAL ENCOUNTER (OUTPATIENT)
Dept: ULTRASOUND IMAGING | Facility: HOSPITAL | Age: 24
Discharge: HOME | End: 2024-02-20
Payer: COMMERCIAL

## 2024-02-20 DIAGNOSIS — N63.0 BREAST NODULE: ICD-10-CM

## 2024-02-20 PROCEDURE — 76642 ULTRASOUND BREAST LIMITED: CPT

## 2024-03-08 ENCOUNTER — OFFICE VISIT (OUTPATIENT)
Dept: INTERNAL MEDICINE | Age: 24
End: 2024-03-08
Payer: COMMERCIAL

## 2024-03-08 VITALS
DIASTOLIC BLOOD PRESSURE: 84 MMHG | HEIGHT: 68 IN | TEMPERATURE: 97.6 F | SYSTOLIC BLOOD PRESSURE: 130 MMHG | WEIGHT: 220 LBS | BODY MASS INDEX: 33.34 KG/M2 | OXYGEN SATURATION: 97 % | HEART RATE: 68 BPM

## 2024-03-08 DIAGNOSIS — R59.9 ADENOPATHY: Primary | ICD-10-CM

## 2024-03-08 LAB
BASOPHILS # BLD AUTO: 0.01 10*3/MM3 (ref 0–0.2)
BASOPHILS NFR BLD AUTO: 0.2 % (ref 0–1.5)
EOSINOPHIL # BLD AUTO: 0.07 10*3/MM3 (ref 0–0.4)
EOSINOPHIL NFR BLD AUTO: 1.4 % (ref 0.3–6.2)
ERYTHROCYTE [DISTWIDTH] IN BLOOD BY AUTOMATED COUNT: 11.8 % (ref 12.3–15.4)
HCT VFR BLD AUTO: 39.2 % (ref 34–46.6)
HGB BLD-MCNC: 13.3 G/DL (ref 12–15.9)
IMM GRANULOCYTES # BLD AUTO: 0.01 10*3/MM3 (ref 0–0.05)
IMM GRANULOCYTES NFR BLD AUTO: 0.2 % (ref 0–0.5)
LYMPHOCYTES # BLD AUTO: 2.3 10*3/MM3 (ref 0.7–3.1)
LYMPHOCYTES NFR BLD AUTO: 45.3 % (ref 19.6–45.3)
MCH RBC QN AUTO: 31.8 PG (ref 26.6–33)
MCHC RBC AUTO-ENTMCNC: 33.9 G/DL (ref 31.5–35.7)
MCV RBC AUTO: 93.8 FL (ref 79–97)
MONOCYTES # BLD AUTO: 0.33 10*3/MM3 (ref 0.1–0.9)
MONOCYTES NFR BLD AUTO: 6.5 % (ref 5–12)
NEUTROPHILS # BLD AUTO: 2.36 10*3/MM3 (ref 1.7–7)
NEUTROPHILS NFR BLD AUTO: 46.4 % (ref 42.7–76)
NRBC BLD AUTO-RTO: 0 /100 WBC (ref 0–0.2)
PLATELET # BLD AUTO: 233 10*3/MM3 (ref 140–450)
RBC # BLD AUTO: 4.18 10*6/MM3 (ref 3.77–5.28)
WBC # BLD AUTO: 5.08 10*3/MM3 (ref 3.4–10.8)

## 2024-03-08 PROCEDURE — 1160F RVW MEDS BY RX/DR IN RCRD: CPT

## 2024-03-08 PROCEDURE — 99213 OFFICE O/P EST LOW 20 MIN: CPT

## 2024-03-08 PROCEDURE — 1159F MED LIST DOCD IN RCRD: CPT

## 2024-03-08 NOTE — PROGRESS NOTES
"    I N T E R N A L  M E D I C I N E  Neelima Harper, APRN    ENCOUNTER DATE:  03/08/2024    Yara Carroll / 23 y.o. / female      CHIEF COMPLAINT / REASON FOR OFFICE VISIT     Breast Problem      ASSESSMENT & PLAN     Diagnoses and all orders for this visit:    1. Adenopathy (Primary)  -     CBC & Differential  -     US Head Neck Soft Tissue; Future         SUMMARY/DISCUSSION  Left breast symptoms have fully resolved without any new palpable abnormality.  She will monitor for any new adenopathy, changes in current right occipital adenopathy.  Will update CBC and undergo ultrasound for further investigation.        Next Appointment with me: 6/3/2024    Return for Next scheduled follow up.      VITAL SIGNS     Visit Vitals  /84   Pulse 68   Temp 97.6 °F (36.4 °C)   Ht 172.7 cm (67.99\")   Wt 99.8 kg (220 lb)   LMP 02/10/2024   SpO2 97%   BMI 33.46 kg/m²             Wt Readings from Last 3 Encounters:   03/08/24 99.8 kg (220 lb)   11/02/23 109 kg (240 lb)   06/13/23 130 kg (287 lb 6.4 oz)     Body mass index is 33.46 kg/m².        MEDICATIONS AT THE TIME OF OFFICE VISIT     No current outpatient medications on file prior to visit.     No current facility-administered medications on file prior to visit.        HISTORY OF PRESENT ILLNESS     Underwent left breast imaging in November 2023 for palpable lump, found 2 weeks prior, associated with new bruising.  Ultrasound showed probable cyst and recommended repeat ultrasound in 3 months.  February 2024 breast ultrasound showed no evidence of malignancy; left breast swelling and edema had resolved and the cyst has decreased in size.  At November 2023 appointment, I noted a pea sized round nodule located at 2 o'clock of left breast without overlying skin changes, no pain with palpation.  Patient is no longer able to identify any palpable abnormality.  Denies any skin changes, breast pain.    4 week history of single right occipital adenopathy, non tender.  Some " nasal congestion prior to onset of symptoms, but otherwise, no fever, chills, night sweets, fatigue.  Denies any other adenopathy.      Patient Care Team:  Neelima Harper APRN as PCP - General (Family Medicine)    REVIEW OF SYSTEMS     Review of Systems   Constitutional:  Negative for chills, fever and unexpected weight change.   Respiratory:  Negative for cough, chest tightness and shortness of breath.    Cardiovascular:  Negative for chest pain, palpitations and leg swelling.   Neurological:  Negative for dizziness, weakness, light-headedness and headaches.   Hematological:  Positive for adenopathy (Right occipital).   Psychiatric/Behavioral:  The patient is not nervous/anxious.           PHYSICAL EXAMINATION     Physical Exam  Vitals reviewed.   Constitutional:       General: She is not in acute distress.     Appearance: Normal appearance. She is not ill-appearing, toxic-appearing or diaphoretic.   HENT:      Head: Normocephalic and atraumatic.   Cardiovascular:      Rate and Rhythm: Normal rate and regular rhythm.      Heart sounds: Normal heart sounds.   Pulmonary:      Effort: Pulmonary effort is normal.      Breath sounds: Normal breath sounds.   Chest:   Breasts:     Right: Normal.      Left: Normal.      Comments: No palpable cyst or skin changes of area of concern on left breast identified at November 2023 appointment  Lymphadenopathy:      Cervical: Cervical adenopathy (Single on right occipital area, pea sized, non tender, no overlying skin changes) present.   Neurological:      Mental Status: She is alert and oriented to person, place, and time. Mental status is at baseline.   Psychiatric:         Mood and Affect: Mood normal.         Behavior: Behavior normal.         Thought Content: Thought content normal.         Judgment: Judgment normal.           REVIEWED DATA     Labs:           Imaging:            Medical Tests:           Summary of old records / correspondence / consultant report:            Request outside records:

## 2024-03-21 ENCOUNTER — HOSPITAL ENCOUNTER (OUTPATIENT)
Dept: ULTRASOUND IMAGING | Facility: HOSPITAL | Age: 24
Discharge: HOME | End: 2024-03-21
Payer: COMMERCIAL

## 2024-03-21 DIAGNOSIS — R59.9 ADENOPATHY: ICD-10-CM

## 2024-03-21 PROCEDURE — 76536 US EXAM OF HEAD AND NECK: CPT
